# Patient Record
Sex: MALE | Race: WHITE | NOT HISPANIC OR LATINO | Employment: OTHER | URBAN - METROPOLITAN AREA
[De-identification: names, ages, dates, MRNs, and addresses within clinical notes are randomized per-mention and may not be internally consistent; named-entity substitution may affect disease eponyms.]

---

## 2021-07-06 ENCOUNTER — AMBULATORY - HEALTHEAST (OUTPATIENT)
Dept: GERIATRICS | Facility: CLINIC | Age: 76
End: 2021-07-06

## 2021-07-07 RX ORDER — ATORVASTATIN CALCIUM 40 MG/1
40 TABLET, FILM COATED ORAL AT BEDTIME
Status: SHIPPED | COMMUNITY
Start: 2021-07-06

## 2021-07-07 RX ORDER — LOSARTAN POTASSIUM 25 MG/1
25 TABLET ORAL AT BEDTIME
Status: SHIPPED | COMMUNITY
Start: 2021-07-06

## 2021-07-07 RX ORDER — NITROGLYCERIN 0.4 MG/1
0.4 TABLET SUBLINGUAL EVERY 5 MIN PRN
Status: SHIPPED | COMMUNITY
Start: 2021-07-06

## 2021-07-07 RX ORDER — TRAMADOL HYDROCHLORIDE 50 MG/1
25 TABLET ORAL EVERY 6 HOURS PRN
Status: SHIPPED | COMMUNITY
Start: 2021-07-06

## 2021-07-07 RX ORDER — ASPIRIN 81 MG/1
81 TABLET, CHEWABLE ORAL AT BEDTIME
Status: SHIPPED | COMMUNITY
Start: 2021-07-06

## 2021-07-07 RX ORDER — CARVEDILOL 12.5 MG/1
12.5 TABLET ORAL 2 TIMES DAILY WITH MEALS
Status: SHIPPED | COMMUNITY
Start: 2021-07-06

## 2021-07-07 RX ORDER — ACETAMINOPHEN 500 MG
1000 TABLET ORAL 3 TIMES DAILY
Status: SHIPPED | COMMUNITY
Start: 2021-07-06

## 2021-07-07 RX ORDER — FUROSEMIDE 40 MG
60 TABLET ORAL DAILY
Status: SHIPPED | COMMUNITY
Start: 2021-07-07

## 2021-07-11 PROBLEM — I47.29 NSVT (NONSUSTAINED VENTRICULAR TACHYCARDIA) (H): Status: ACTIVE | Noted: 2021-07-07

## 2021-07-11 PROBLEM — I63.9 CEREBROVASCULAR ACCIDENT (CVA) (H): Status: ACTIVE | Noted: 2021-04-19

## 2021-07-11 PROBLEM — I21.4 NSTEMI (NON-ST ELEVATED MYOCARDIAL INFARCTION) (H): Status: ACTIVE | Noted: 2021-07-07

## 2021-07-11 PROBLEM — Z71.89 ACP (ADVANCE CARE PLANNING): Status: ACTIVE | Noted: 2021-03-29
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
## 2021-07-11 PROBLEM — I50.42 CHRONIC COMBINED SYSTOLIC AND DIASTOLIC HEART FAILURE (H): Status: ACTIVE | Noted: 2021-04-01

## 2021-07-11 PROBLEM — C61 PROSTATE CANCER (H): Status: ACTIVE | Noted: 2018-08-22

## 2021-07-11 PROBLEM — I47.10 PAROXYSMAL SUPRAVENTRICULAR TACHYCARDIA (H): Status: ACTIVE | Noted: 2021-06-07

## 2021-07-11 PROBLEM — S42.215A CLOSED NONDISPLACED FRACTURE OF SURGICAL NECK OF LEFT HUMERUS: Status: ACTIVE | Noted: 2021-07-02

## 2021-07-11 PROBLEM — Z78.9 MEDICALLY COMPLEX PATIENT: Status: ACTIVE | Noted: 2021-06-18

## 2021-07-14 ENCOUNTER — LAB REQUISITION (OUTPATIENT)
Dept: LAB | Facility: CLINIC | Age: 76
End: 2021-07-14

## 2021-07-14 VITALS
HEIGHT: 71 IN | HEART RATE: 67 BPM | RESPIRATION RATE: 20 BRPM | SYSTOLIC BLOOD PRESSURE: 119 MMHG | BODY MASS INDEX: 26.57 KG/M2 | TEMPERATURE: 97.7 F | DIASTOLIC BLOOD PRESSURE: 72 MMHG | OXYGEN SATURATION: 95 % | WEIGHT: 189.8 LBS

## 2021-07-14 DIAGNOSIS — S42.215D: ICD-10-CM

## 2021-07-14 DIAGNOSIS — I10 ESSENTIAL (PRIMARY) HYPERTENSION: ICD-10-CM

## 2021-07-14 DIAGNOSIS — E78.00 PURE HYPERCHOLESTEROLEMIA, UNSPECIFIED: ICD-10-CM

## 2021-07-14 DIAGNOSIS — E87.6 HYPOKALEMIA: ICD-10-CM

## 2021-07-14 ASSESSMENT — MIFFLIN-ST. JEOR: SCORE: 1603.06

## 2021-07-15 ENCOUNTER — TRANSITIONAL CARE UNIT VISIT (OUTPATIENT)
Dept: GERIATRICS | Facility: CLINIC | Age: 76
End: 2021-07-15
Payer: MEDICARE

## 2021-07-15 ENCOUNTER — TELEPHONE (OUTPATIENT)
Dept: GERIATRICS | Facility: CLINIC | Age: 76
End: 2021-07-15

## 2021-07-15 DIAGNOSIS — E78.00 PURE HYPERCHOLESTEROLEMIA: ICD-10-CM

## 2021-07-15 DIAGNOSIS — I50.42 CHRONIC COMBINED SYSTOLIC AND DIASTOLIC HEART FAILURE (H): ICD-10-CM

## 2021-07-15 DIAGNOSIS — S42.215D CLOSED NONDISPLACED FRACTURE OF SURGICAL NECK OF LEFT HUMERUS WITH ROUTINE HEALING, UNSPECIFIED FRACTURE MORPHOLOGY, SUBSEQUENT ENCOUNTER: Primary | ICD-10-CM
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
## 2021-07-15 LAB
ANION GAP SERPL CALCULATED.3IONS-SCNC: 13 MMOL/L (ref 5–18)
BUN SERPL-MCNC: 25 MG/DL (ref 8–28)
CALCIUM SERPL-MCNC: 8.8 MG/DL (ref 8.5–10.5)
CHLORIDE BLD-SCNC: 105 MMOL/L (ref 98–107)
CO2 SERPL-SCNC: 25 MMOL/L (ref 22–31)
CREAT SERPL-MCNC: 0.76 MG/DL (ref 0.7–1.3)
ERYTHROCYTE [DISTWIDTH] IN BLOOD BY AUTOMATED COUNT: 14.1 % (ref 10–15)
GFR SERPL CREATININE-BSD FRML MDRD: 87 ML/MIN/1.73M2
GLUCOSE BLD-MCNC: 85 MG/DL (ref 70–125)
HCT VFR BLD AUTO: 36 % (ref 40–53)
HGB BLD-MCNC: 11.9 G/DL (ref 13.3–17.7)
MAGNESIUM SERPL-MCNC: 2 MG/DL (ref 1.8–2.6)
MCH RBC QN AUTO: 30.1 PG (ref 26.5–33)
MCHC RBC AUTO-ENTMCNC: 33.1 G/DL (ref 31.5–36.5)
MCV RBC AUTO: 91 FL (ref 78–100)
PLATELET # BLD AUTO: 178 10E3/UL (ref 150–450)
POTASSIUM BLD-SCNC: 3.6 MMOL/L (ref 3.5–5)
RBC # BLD AUTO: 3.95 10E6/UL (ref 4.4–5.9)
SODIUM SERPL-SCNC: 143 MMOL/L (ref 136–145)
WBC # BLD AUTO: 8.2 10E3/UL (ref 4–11)

## 2021-07-15 PROCEDURE — P9604 ONE-WAY ALLOW PRORATED TRIP: HCPCS

## 2021-07-15 PROCEDURE — 99309 SBSQ NF CARE MODERATE MDM 30: CPT | Performed by: FAMILY MEDICINE

## 2021-07-15 PROCEDURE — 83735 ASSAY OF MAGNESIUM: CPT

## 2021-07-15 PROCEDURE — 85027 COMPLETE CBC AUTOMATED: CPT

## 2021-07-15 PROCEDURE — 36415 COLL VENOUS BLD VENIPUNCTURE: CPT

## 2021-07-15 PROCEDURE — 80048 BASIC METABOLIC PNL TOTAL CA: CPT

## 2021-07-15 NOTE — LETTER
7/15/2021        RE: Quique Hernandez  [address] [facility] [address]        Progress Notes by Tiara Norton MBBS at 7/8/2021 11:59 PM     Author: Tiara Norton MBBS Service: -- Author Type: Physician    Filed: 7/8/2021 12:34 PM Encounter Date: 7/8/2021 Status: Signed    : Tiara Norton MBBS (Physician)         [facility] note      Patient: Quique Hernandez  MRN: [id number]      [facility] [[id number]]  Reason for Visit     Chief Complaint   Patient presents with     Follow-up WEAKNESS AND FRACTURE       Code Status     FULL CODE    Assessment     Closed nondisplaced fracture of the surgical neck of the left humerus.  History of acute mechanical fall.  History of chronic combined systolic and diastolic heart failure.  Hyperlipidemia   generalized weakness    Plan     Patient admitted to the TCU for strengthening and rehab.  He presented after he fell down the stairs.  Fall was felt to be mechanical.  He had left proximal humerus fracture   Currently he is being treated conservatively and has a sling in place and he remains nonweightbearing.  Pain concerns reviewed and he is reporting less pain.  Continue with his Tylenol and tramadol.  Clinically appears to be euvolemic with no evidence of any volume overload either.  Cognitively remains impaired slums is 11/30.  Balance score of 29/56 on his Quintero indicates a moderate high fall risk.  Continue with his PT and OT      History     Patient is a very pleasant 76 y.o. male who is admitted to TCU  Patient presented to the hospital after he fell down stairs.  Fall was felt to be mechanical.  He was noted to have left proximal humerus fracture.  This will be managed conservatively and nonoperatively.  He has been given a shoulder immobilizer.  Pain management optimized using scheduled Tylenol.  He is also on tramadol as needed.  He reports that his pain is controlled  It was felt that he has 20 steps to climb to his apartment and could  benefit from physical therapy.  He also has a history of CHF.  Echocardiogram reveals an ejection fraction of 25 to 30% he will require monitoring in the TCU  On medical management and diuretics  Appears stable on exam no worsening edema noted no shortness of breath reported overall at baseline    Past Medical History     Active Ambulatory (Non-Hospital) Problems    Diagnosis     NSTEMI (non-ST elevated myocardial infarction) (H)     NSVT (nonsustained ventricular tachycardia) (H)     Closed nondisplaced fracture of surgical neck of left humerus     Fall down stairs     Medically complex patient     Paroxysmal supraventricular tachycardia (H)     Cerebrovascular accident (CVA) (H)     Chronic combined systolic and diastolic heart failure (H)     ACP (advance care planning)     Prostate cancer (H)     Nevus of choroid     Osteoarthritis of hip     Pure hypercholesterolemia     Coronary atherosclerosis     Essential hypertension     Gout     Past Medical History:   Diagnosis Date     ACP (advance care planning) 03/29/2021     Cerebrovascular accident (CVA) (H) 04/19/2021     Chronic combined systolic and diastolic heart failure (H) 04/01/2021     Closed nondisplaced fracture of surgical neck of left humerus 07/02/2021     Coronary atherosclerosis 06/26/2006     Essential hypertension 06/26/2006     Fall down stairs 07/02/2021     Gout 06/26/2006     Medically complex patient 06/18/2021     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 His smoking use included cigarettes. He has a 20.00 pack-year smoking history. He quit smokeless tobacco use about 25 years ago.  His smokeless tobacco use included chew. He  reports current alcohol use. He reports that he does not use drugs.    Family History     Reviewed, and family history includes Heart disease in his mother.    Medication List   Post Discharge Medication Reconc  Current Outpatient Medications on File Prior to Visit   Medication Sig Dispense Refill     acetaminophen (TYLENOL) 500 MG tablet Take 1,000 mg by mouth 3 (three) times a day. Max dose 4000mg/24 hrs       aspirin 81 mg chewable tablet Chew 81 mg at bedtime.       atorvastatin (LIPITOR) 40 MG tablet Take 40 mg by mouth at bedtime.       carvediloL (COREG) 12.5 MG tablet Take 12.5 mg by mouth 2 (two) times a day with meals.       furosemide (LASIX) 40 MG tablet Take 60 mg by mouth daily.       losartan (COZAAR) 25 MG tablet Take 25 mg by mouth at bedtime.       nitroglycerin (NITROSTAT) 0.4 MG SL tablet Place 0.4 mg under the tongue every 5 (five) minutes as needed for chest pain. Give 0.4 mg sublingually every 5 minutes as needed for Chest pain; If >24 doses in 30 days provider to authorize       traMADoL (ULTRAM) 50 mg tablet Take 25 mg by mouth every 6 (six) hours as needed for pain.       Current Facility-Administered Medications on File Prior to Visit   Medication Dose Route Frequency Provider Last Rate Last Admin     [DISCONTINUED] GENERIC EXTERNAL MEDICATION     GENERIC EXTERNAL DATA PROVIDER           Allergies     No Known Allergies    Review of Systems   A comprehensive review of 14 systems was done. Pertinent findings noted here and in history of present illness. All the rest negative.  Constitutional: Negative.  Negative for fever, chills, he has activity change, appetite change and fatigue.   HENT: Negative for congestion and facial swelling.    Eyes: Negative for photophobia, redness and visual disturbance.   Respiratory: Negative for cough and chest tightness.    Cardiovascular: Negative for chest pain, palpitations and leg swelling.   Gastrointestinal: Negative for nausea, diarrhea,  "constipation, blood in stool and abdominal distention.   Genitourinary: Negative.    Musculoskeletal: has pain in left shoulder    Skin: Negative.    Neurological: Negative for dizziness, tremors, syncope, weakness, light-headedness and headaches.   Has baseline slurred speech  Hematological: Does not bruise/bleed easily.   Psychiatric/Behavioral: impaired recall       Physical Exam     Recent Vitals 7/7/2021   Height 5' 11\"   Weight 192 lbs 3 oz   BSA (m2) 2.09 m2   /70   Pulse 55   Temp 97.7   SpO2 95       Constitutional: Oriented to person, place, and appears well-developed.   HEENT:  Normocephalic and atraumatic.  Eyes: Conjunctivae and EOM are normal. Pupils are equal, round, and reactive to light. No discharge.  No scleral icterus. Nose normal. Mouth/Throat: Oropharynx is clear and moist. No oropharyngeal exudate.    NECK: Normal range of motion. Neck supple. No JVD present. No tracheal deviation present. No thyromegaly present.   CARDIOVASCULAR: Normal rate, regular rhythm and intact distal pulses.  Exam reveals no gallop and no friction rub.  Systolic murmur present.  PULMONARY: Effort normal and breath sounds normal. No respiratory distress.No Wheezing or rales.  ABDOMEN: Soft. Bowel sounds are normal. No distension and no mass.  There is no tenderness. There is no rebound and no guarding. No HSM.  MUSCULOSKELETAL: Normal range of motion. No edema and no tenderness. Mild kyphosis, no tenderness.  LUE in a sling  LYMPH NODES: Has no cervical, supraclavicular, axillary and groin adenopathy.   NEUROLOGICAL: Alert and oriented to person, place, and time. No cranial nerve deficit.  Normal muscle tone. Coordination normal.   GENITOURINARY: Deferred exam.  SKIN: Skin is warm and dry. No rash noted. No erythema. No pallor.   Ecchymosis and swelling noted around left arm  EXTREMITIES: No cyanosis, no clubbing, no edema. No Deformity.  PSYCHIATRIC: Normal mood, affect and behavior. Recall is impaired      Lab " Results     Recent Results (from the past 240 hour(s))   COVID-19 VIRUS (CORONAVIRUS) BY PCR - EXTERNAL RESULT    Collection Time: 07/02/21  3:28 PM    Specimen: Other    Specimen type and source: Other, Specimen from nasopharyngeal structure (specimen)   Result Value Ref Range    COVID-19 Virus by PCR (External Result) Negative Negative   COVID-19 VIRUS (CORONAVIRUS) BY PCR - EXTERNAL RESULT    Collection Time: 07/05/21  1:43 PM    Specimen: Other    Specimen type and source: Other, Specimen from nasopharyngeal structure (specimen)   Result Value Ref Range    COVID-19 Virus by PCR (External Result) Negative Negative                  Electronically signed by  TODD Leyva                Sincerely,        TODD Leyva

## 2021-07-15 NOTE — PROGRESS NOTES
Progress Notes by Tiara Norton MBBS at 7/8/2021 11:59 PM     Author: Tiara Norton MBBS Service: -- Author Type: Physician    Filed: 7/8/2021 12:34 PM Encounter Date: 7/8/2021 Status: Signed    : Tiara Norton MBBS (Physician)         [facility] note      Patient: Quique Hernandez  MRN: [id number]      [facility] [[id number]]  Reason for Visit     Chief Complaint   Patient presents with     Follow-up WEAKNESS AND FRACTURE       Code Status     FULL CODE    Assessment     Closed nondisplaced fracture of the surgical neck of the left humerus.  History of acute mechanical fall.  History of chronic combined systolic and diastolic heart failure.  Hyperlipidemia   generalized weakness    Plan     Patient admitted to the TCU for strengthening and rehab.  He presented after he fell down the stairs.  Fall was felt to be mechanical.  He had left proximal humerus fracture   Currently he is being treated conservatively and has a sling in place and he remains nonweightbearing.  Pain concerns reviewed and he is reporting less pain.  Continue with his Tylenol and tramadol.  Clinically appears to be euvolemic with no evidence of any volume overload either.  Cognitively remains impaired slums is 11/30.  Balance score of 29/56 on his Quintero indicates a moderate high fall risk.  Continue with his PT and OT      History     Patient is a very pleasant 76 y.o. male who is admitted to TCU  Patient presented to the hospital after he fell down stairs.  Fall was felt to be mechanical.  He was noted to have left proximal humerus fracture.  This will be managed conservatively and nonoperatively.  He has been given a shoulder immobilizer.  Pain management optimized using scheduled Tylenol.  He is also on tramadol as needed.  He reports that his pain is controlled  It was felt that he has 20 steps to climb to his apartment and could benefit from physical therapy.  He also has a history of CHF.  Echocardiogram  reveals an ejection fraction of 25 to 30% he will require monitoring in the TCU  On medical management and diuretics  Appears stable on exam no worsening edema noted no shortness of breath reported overall at baseline    Past Medical History     Active Ambulatory (Non-Hospital) Problems    Diagnosis     NSTEMI (non-ST elevated myocardial infarction) (H)     NSVT (nonsustained ventricular tachycardia) (H)     Closed nondisplaced fracture of surgical neck of left humerus     Fall down stairs     Medically complex patient     Paroxysmal supraventricular tachycardia (H)     Cerebrovascular accident (CVA) (H)     Chronic combined systolic and diastolic heart failure (H)     ACP (advance care planning)     Prostate cancer (H)     Nevus of choroid     Osteoarthritis of hip     Pure hypercholesterolemia     Coronary atherosclerosis     Essential hypertension     Gout     Past Medical History:   Diagnosis Date     ACP (advance care planning) 03/29/2021     Cerebrovascular accident (CVA) (H) 04/19/2021     Chronic combined systolic and diastolic heart failure (H) 04/01/2021     Closed nondisplaced fracture of surgical neck of left humerus 07/02/2021     Coronary atherosclerosis 06/26/2006     Essential hypertension 06/26/2006     Fall down stairs 07/02/2021     Gout 06/26/2006     Medically complex patient 06/18/2021     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 His smoking use included cigarettes. He has a 20.00 pack-year smoking history. He quit smokeless tobacco use about 25 years ago.  His smokeless tobacco use included chew. He reports current alcohol use. He reports that he does not use drugs.    Family  History     Reviewed, and family history includes Heart disease in his mother.    Medication List   Post Discharge Medication Reconc  Current Outpatient Medications on File Prior to Visit   Medication Sig Dispense Refill     acetaminophen (TYLENOL) 500 MG tablet Take 1,000 mg by mouth 3 (three) times a day. Max dose 4000mg/24 hrs       aspirin 81 mg chewable tablet Chew 81 mg at bedtime.       atorvastatin (LIPITOR) 40 MG tablet Take 40 mg by mouth at bedtime.       carvediloL (COREG) 12.5 MG tablet Take 12.5 mg by mouth 2 (two) times a day with meals.       furosemide (LASIX) 40 MG tablet Take 60 mg by mouth daily.       losartan (COZAAR) 25 MG tablet Take 25 mg by mouth at bedtime.       nitroglycerin (NITROSTAT) 0.4 MG SL tablet Place 0.4 mg under the tongue every 5 (five) minutes as needed for chest pain. Give 0.4 mg sublingually every 5 minutes as needed for Chest pain; If >24 doses in 30 days provider to authorize       traMADoL (ULTRAM) 50 mg tablet Take 25 mg by mouth every 6 (six) hours as needed for pain.       Current Facility-Administered Medications on File Prior to Visit   Medication Dose Route Frequency Provider Last Rate Last Admin     [DISCONTINUED] GENERIC EXTERNAL MEDICATION     GENERIC EXTERNAL DATA PROVIDER           Allergies     No Known Allergies    Review of Systems   A comprehensive review of 14 systems was done. Pertinent findings noted here and in history of present illness. All the rest negative.  Constitutional: Negative.  Negative for fever, chills, he has activity change, appetite change and fatigue.   HENT: Negative for congestion and facial swelling.    Eyes: Negative for photophobia, redness and visual disturbance.   Respiratory: Negative for cough and chest tightness.    Cardiovascular: Negative for chest pain, palpitations and leg swelling.   Gastrointestinal: Negative for nausea, diarrhea, constipation, blood in stool and abdominal distention.   Genitourinary: Negative.   "  Musculoskeletal: has pain in left shoulder    Skin: Negative.    Neurological: Negative for dizziness, tremors, syncope, weakness, light-headedness and headaches.   Has baseline slurred speech  Hematological: Does not bruise/bleed easily.   Psychiatric/Behavioral: impaired recall       Physical Exam     Recent Vitals 7/7/2021   Height 5' 11\"   Weight 192 lbs 3 oz   BSA (m2) 2.09 m2   /70   Pulse 55   Temp 97.7   SpO2 95       Constitutional: Oriented to person, place, and appears well-developed.   HEENT:  Normocephalic and atraumatic.  Eyes: Conjunctivae and EOM are normal. Pupils are equal, round, and reactive to light. No discharge.  No scleral icterus. Nose normal. Mouth/Throat: Oropharynx is clear and moist. No oropharyngeal exudate.    NECK: Normal range of motion. Neck supple. No JVD present. No tracheal deviation present. No thyromegaly present.   CARDIOVASCULAR: Normal rate, regular rhythm and intact distal pulses.  Exam reveals no gallop and no friction rub.  Systolic murmur present.  PULMONARY: Effort normal and breath sounds normal. No respiratory distress.No Wheezing or rales.  ABDOMEN: Soft. Bowel sounds are normal. No distension and no mass.  There is no tenderness. There is no rebound and no guarding. No HSM.  MUSCULOSKELETAL: Normal range of motion. No edema and no tenderness. Mild kyphosis, no tenderness.  LUE in a sling  LYMPH NODES: Has no cervical, supraclavicular, axillary and groin adenopathy.   NEUROLOGICAL: Alert and oriented to person, place, and time. No cranial nerve deficit.  Normal muscle tone. Coordination normal.   GENITOURINARY: Deferred exam.  SKIN: Skin is warm and dry. No rash noted. No erythema. No pallor.   Ecchymosis and swelling noted around left arm  EXTREMITIES: No cyanosis, no clubbing, no edema. No Deformity.  PSYCHIATRIC: Normal mood, affect and behavior. Recall is impaired      Lab Results     Recent Results (from the past 240 hour(s))   COVID-19 VIRUS " (CORONAVIRUS) BY PCR - EXTERNAL RESULT    Collection Time: 07/02/21  3:28 PM    Specimen: Other    Specimen type and source: Other, Specimen from nasopharyngeal structure (specimen)   Result Value Ref Range    COVID-19 Virus by PCR (External Result) Negative Negative   COVID-19 VIRUS (CORONAVIRUS) BY PCR - EXTERNAL RESULT    Collection Time: 07/05/21  1:43 PM    Specimen: Other    Specimen type and source: Other, Specimen from nasopharyngeal structure (specimen)   Result Value Ref Range    COVID-19 Virus by PCR (External Result) Negative Negative                  Electronically signed by  TODD Leyva

## 2021-07-15 NOTE — TELEPHONE ENCOUNTER
[facility] Nurse Triage Telephone Note    Provider: Tiara Norton MD  Facility: [facility]  Facility Type:  TCU    Caller: Tavon  Call Back Number: [phone]    Allergies:  No Known Allergies     Reason for call: Nurse calling to report Heme 2, BMP, and Mg levels.      Verbal Order/Direction given by Provider: No new orders.      Provider giving Order:  Tiara Norton MD    Verbal Order given to: Tavon Bynum RN

## 2021-07-19 NOTE — PROGRESS NOTES
[facility] GERIATRIC SERVICES  Chief Complaint   Patient presents with     ASPEN     [facility] Medical Record Number:  [id number]  Place of Service where encounter took place:  [facility] ([facility]) [[id number]]    HISTORY:      HPI:  Quique Hernandez  is 76 year old (1945) undergoing physical and occupational therapy. He is with a history of chronic heart failure and hypertension who was admitted 7/2/2021 with left proximal humerus fracture. He presented to emergency department with left shoulder following a mechanical fall at home.    He was found to have a left non-displaced proximal humerus fracture. He was placed into a shoulder immobilizer, pain managed with scheduled doses of acetaminophen and oxycodone as needed    Today he was seen to review vital signs, pain management, follow-up left humerus fracture and to establish care.  He denied chest pain shortness of breath constipation diarrhea cough or congestion.  He reports his pain is controlled with current medications.  He did have +1 lower extremity edema.  He will follow up with orthopedics on 7/21/2021.  Vital signs are all stable he is afebrile.  He denies any numbness tingling left upper extremity..  Cap refill less than 3 seconds 3+ radial pulse.    ALLERGIES:Patient has no known allergies.    PAST MEDICAL HISTORY:   Past Medical History:   Diagnosis Date     ACP (advance care planning) 03/29/2021    Formatting of this note might be different from the original. Discussed on admission 3/29/2021  - FULL CODE     Cerebrovascular accident (CVA) (H) 04/19/2021     Chronic combined systolic and diastolic heart failure (H) 04/01/2021    Formatting of this note might be different from the original. 3/30/2021 echocardiogram:  1. Severely decreased left ventricular systolic function in global distribution. Estimated left ventricular ejection fraction is 25-30%.      2. Right ventricle was not well visualized.      3. No significant valvular heart disease.       4. Small anterior pericardial effusion. Exudative material in the pericar     Closed nondisplaced fracture of surgical neck of left humerus 07/02/2021    Formatting of this note might be different from the original. 7/2/2021 xray humerus:  One-part proximal left humerus fracture with a nondisplaced transverse fracture through the surgical neck. No dislocation of the humeral head.     Coronary atherosclerosis 06/26/2006     Essential hypertension 06/26/2006    Formatting of this note might be different from the original. Losartan and carvedilol     Fall down stairs 07/02/2021    Formatting of this note might be different from the original. 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 OS   ST near ora     NSVT (nonsustained ventricular tachycardia) (H) 07/07/2021     Osteoarthritis of hip 07/11/2009     Paroxysmal supraventricular tachycardia (H) 06/07/2021    Formatting of this note might be different from the original. 6/7/2021 asymptomatic, noted on telemetry     Prostate cancer (H) 08/22/2018     Pure hypercholesterolemia 03/05/2007    Formatting of this note might be different from the original. Atorvastatin       PAST SURGICAL HISTORY:   has a past surgical history that includes other surgical history; Total Hip Arthroplasty (Right, 07/06/2009); Transrectal Ultrasonic, Transurethral Resection (TUR) Of Prostate Cyst (10/26/2010); Phacoemulsification clear cornea with standard intraocular lens implant (Right, 04/05/2018); Cataract Extraction Extracapsular W/ Intraocular Lens Implantation (Left, 03/22/2018); and other surgical history (10/28/2018).    FAMILY HISTORY: family history includes Heart Disease in his mother.    SOCIAL HISTORY:  reports that he quit smoking about 25 years ago. His smoking use included cigarettes. He has a 20.00 pack-year smoking history. He quit smokeless tobacco  "use about 25 years ago.  His smokeless tobacco use included chew. He reports current alcohol use. He reports that he does not use drugs.    ROS:  Constitutional: Negative for activity change, appetite change, fatigue and fever.   HENT: Negative for congestion.    Respiratory: Negative for cough, shortness of breath and wheezing.    Cardiovascular: Negative for chest pain and leg swelling.   Gastrointestinal: Negative for abdominal distention, abdominal pain, constipation, diarrhea and nausea.   Genitourinary: Negative for dysuria.   Musculoskeletal: Negative for arthralgia. Negative for back pain.  Nonoperative left humerus fracture nonweightbearing  Skin: Negative for color change and wound.   Neurological: Negative for dizziness.   Psychiatric/Behavioral: Negative for agitation, behavioral problems and confusion.     Physical Exam:  Constitutional:       Appearance: Patient is well-developed.  Pleasant gentleman in no acute distress  HENT: Nonweightbearing left upper extremity     Head: Normocephalic.   Eyes:      Conjunctiva/sclera: Conjunctivae normal.   Neck:      Musculoskeletal: Normal range of motion.   Cardiovascular:      Rate and Rhythm: Normal rate and regular rhythm.      Heart sounds: Normal heart sounds. No murmur.   Pulmonary:      Effort: No respiratory distress.      Breath sounds: Normal breath sounds. No wheezing or rales.   Abdominal:      General: Bowel sounds are normal. There is no distension.      Palpations: Abdomen is soft.      Tenderness: There is no abdominal tenderness.   Musculoskeletal:      Nonweightbearing left upper extremity    Skin:General:        Skin is warm.   Neurological:         Mental Status: Patient is alert and oriented to person, place, and time.   Psychiatric:         Behavior: Behavior normal.     Vitals:/54   Pulse 59   Temp 97.5  F (36.4  C)   Resp 20   Ht 1.803 m (5' 11\")   Wt 86.9 kg (191 lb 9.6 oz)   SpO2 94%   BMI 26.72 kg/m   and Body mass index " is 26.72 kg/m .    Lab/Diagnostic data:   No results found for this or any previous visit (from the past 240 hour(s)).    MEDICATIONS:     Review of your medicines          Accurate as of July 20, 2021 11:59 PM. If you have any questions, ask your nurse or doctor.            CONTINUE these medicines which have NOT CHANGED      Dose / Directions   acetaminophen 500 MG tablet  Commonly known as: TYLENOL      Dose: 1,000 mg  [ACETAMINOPHEN (TYLENOL) 500 MG TABLET] Take 1,000 mg by mouth 3 (three) times a day. Max dose 4000mg/24 hrs  Refills: 0     aspirin 81 MG chewable tablet  Commonly known as: ASA      Dose: 81 mg  [ASPIRIN 81 MG CHEWABLE TABLET] Chew 81 mg at bedtime.  Refills: 0     atorvastatin 40 MG tablet  Commonly known as: LIPITOR      Dose: 40 mg  [ATORVASTATIN (LIPITOR) 40 MG TABLET] Take 40 mg by mouth at bedtime.  Refills: 0     carvedilol 12.5 MG tablet  Commonly known as: COREG      Dose: 12.5 mg  [CARVEDILOL (COREG) 12.5 MG TABLET] Take 12.5 mg by mouth 2 (two) times a day with meals.  Refills: 0     furosemide 40 MG tablet  Commonly known as: LASIX      Dose: 60 mg  [FUROSEMIDE (LASIX) 40 MG TABLET] Take 60 mg by mouth daily.  Refills: 0     Lidocaine 4 % Patch  Commonly known as: LIDOCARE      Dose: 1 patch  Place 1 patch onto the skin every 24 hours To prevent lidocaine toxicity, patient should be patch free for 12 hrs daily.  Refills: 0     losartan 25 MG tablet  Commonly known as: COZAAR      Dose: 25 mg  [LOSARTAN (COZAAR) 25 MG TABLET] Take 25 mg by mouth at bedtime.  Refills: 0     nitroGLYcerin 0.4 MG sublingual tablet  Commonly known as: NITROSTAT      Dose: 0.4 mg  [NITROGLYCERIN (NITROSTAT) 0.4 MG SL TABLET] Place 0.4 mg under the tongue every 5 (five) minutes as needed for chest pain. Give 0.4 mg sublingually every 5 minutes as needed for Chest pain; If >24 doses in 30 days provider to authorize  Refills: 0     traMADol 50 MG tablet  Commonly known as: ULTRAM      Dose: 25 mg  [TRAMADOL  (ULTRAM) 50 MG TABLET] Take 25 mg by mouth every 6 (six) hours as needed for pain.  Refills: 0            ASSESSMENT/PLAN  Nonoperative left humerus fracture follow-up with orthopedics as scheduled pain control    Pain management as needed tramadol, scheduled extra strength Tylenol, lidocaine patch    Congestive heart failure Daily weights continue 60 mg of Lasix daily    Hypertension on carvedilol, losartan    HDL on atorvastatin    Electronically signed by: Kathia Gupta CNP

## 2021-07-20 ENCOUNTER — TRANSITIONAL CARE UNIT VISIT (OUTPATIENT)
Dept: GERIATRICS | Facility: CLINIC | Age: 76
End: 2021-07-20
Payer: MEDICARE

## 2021-07-20 VITALS
WEIGHT: 191.6 LBS | OXYGEN SATURATION: 94 % | BODY MASS INDEX: 26.82 KG/M2 | HEART RATE: 59 BPM | HEIGHT: 71 IN | TEMPERATURE: 97.5 F | RESPIRATION RATE: 20 BRPM | SYSTOLIC BLOOD PRESSURE: 132 MMHG | DIASTOLIC BLOOD PRESSURE: 54 MMHG

## 2021-07-20 DIAGNOSIS — S42.215D CLOSED NONDISPLACED FRACTURE OF SURGICAL NECK OF LEFT HUMERUS WITH ROUTINE HEALING, UNSPECIFIED FRACTURE MORPHOLOGY, SUBSEQUENT ENCOUNTER: Primary | ICD-10-CM

## 2021-07-20 DIAGNOSIS — R53.81 PHYSICAL DECONDITIONING: ICD-10-CM

## 2021-07-20 DIAGNOSIS — I50.42 CHRONIC COMBINED SYSTOLIC AND DIASTOLIC HEART FAILURE (H): ICD-10-CM

## 2021-07-20 PROCEDURE — 99309 SBSQ NF CARE MODERATE MDM 30: CPT | Performed by: NURSE PRACTITIONER

## 2021-07-20 RX ORDER — LIDOCAINE 4 G/G
1 PATCH TOPICAL EVERY 24 HOURS
COMMUNITY

## 2021-07-20 ASSESSMENT — MIFFLIN-ST. JEOR: SCORE: 1611.22

## 2021-07-20 NOTE — LETTER
7/20/2021        RE: Quique Hernandez  [address] [address]        [facility] GERIATRIC SERVICES  Chief Complaint   Patient presents with     RECHECK     [facility] Medical Record Number:  [id number]  Place of Service where encounter took place:  [facility] ([facility]) [[id number]]    HISTORY:      HPI:  Quique Hernandez  is 76 year old (1945) undergoing physical and occupational therapy. He is with a history of chronic heart failure and hypertension who was admitted 7/2/2021 with left proximal humerus fracture. He presented to emergency department with left shoulder following a mechanical fall at home.    He was found to have a left non-displaced proximal humerus fracture. He was placed into a shoulder immobilizer, pain managed with scheduled doses of acetaminophen and oxycodone as needed    Today he was seen to review vital signs, pain management, follow-up left humerus fracture and to establish care.  He denied chest pain shortness of breath constipation diarrhea cough or congestion.  He reports his pain is controlled with current medications.  He did have +1 lower extremity edema.  He will follow up with orthopedics on 7/21/2021.  Vital signs are all stable he is afebrile.  He denies any numbness tingling left upper extremity..  Cap refill less than 3 seconds 3+ radial pulse.    ALLERGIES:Patient has no known allergies.    PAST MEDICAL HISTORY:   Past Medical History:   Diagnosis Date     ACP (advance care planning) 03/29/2021    Formatting of this note might be different from the original. Discussed on admission 3/29/2021  - FULL CODE     Cerebrovascular accident (CVA) (H) 04/19/2021     Chronic combined systolic and diastolic heart failure (H) 04/01/2021    Formatting of this note might be different from the original. 3/30/2021 echocardiogram:  1. Severely decreased left ventricular systolic function in global distribution. Estimated left ventricular ejection fraction is 25-30%.      2. Right  ventricle was not well visualized.      3. No significant valvular heart disease.      4. Small anterior pericardial effusion. Exudative material in the pericar     Closed nondisplaced fracture of surgical neck of left humerus 07/02/2021    Formatting of this note might be different from the original. 7/2/2021 xray humerus:  One-part proximal left humerus fracture with a nondisplaced transverse fracture through the surgical neck. No dislocation of the humeral head.     Coronary atherosclerosis 06/26/2006     Essential hypertension 06/26/2006    Formatting of this note might be different from the original. Losartan and carvedilol     Fall down stairs 07/02/2021    Formatting of this note might be different from the original. 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 OS   ST near ora     NSVT (nonsustained ventricular tachycardia) (H) 07/07/2021     Osteoarthritis of hip 07/11/2009     Paroxysmal supraventricular tachycardia (H) 06/07/2021    Formatting of this note might be different from the original. 6/7/2021 asymptomatic, noted on telemetry     Prostate cancer (H) 08/22/2018     Pure hypercholesterolemia 03/05/2007    Formatting of this note might be different from the original. Atorvastatin       PAST SURGICAL HISTORY:   has a past surgical history that includes other surgical history; Total Hip Arthroplasty (Right, 07/06/2009); Transrectal Ultrasonic, Transurethral Resection (TUR) Of Prostate Cyst (10/26/2010); Phacoemulsification clear cornea with standard intraocular lens implant (Right, 04/05/2018); Cataract Extraction Extracapsular W/ Intraocular Lens Implantation (Left, 03/22/2018); and other surgical history (10/28/2018).    FAMILY HISTORY: family history includes Heart Disease in his mother.    SOCIAL HISTORY:  reports that he quit smoking about 25 years ago. His smoking use  included cigarettes. He has a 20.00 pack-year smoking history. He quit smokeless tobacco use about 25 years ago.  His smokeless tobacco use included chew. He reports current alcohol use. He reports that he does not use drugs.    ROS:  Constitutional: Negative for activity change, appetite change, fatigue and fever.   HENT: Negative for congestion.    Respiratory: Negative for cough, shortness of breath and wheezing.    Cardiovascular: Negative for chest pain and leg swelling.   Gastrointestinal: Negative for abdominal distention, abdominal pain, constipation, diarrhea and nausea.   Genitourinary: Negative for dysuria.   Musculoskeletal: Negative for arthralgia. Negative for back pain.  Nonoperative left humerus fracture nonweightbearing  Skin: Negative for color change and wound.   Neurological: Negative for dizziness.   Psychiatric/Behavioral: Negative for agitation, behavioral problems and confusion.     Physical Exam:  Constitutional:       Appearance: Patient is well-developed.  Pleasant gentleman in no acute distress  HENT: Nonweightbearing left upper extremity     Head: Normocephalic.   Eyes:      Conjunctiva/sclera: Conjunctivae normal.   Neck:      Musculoskeletal: Normal range of motion.   Cardiovascular:      Rate and Rhythm: Normal rate and regular rhythm.      Heart sounds: Normal heart sounds. No murmur.   Pulmonary:      Effort: No respiratory distress.      Breath sounds: Normal breath sounds. No wheezing or rales.   Abdominal:      General: Bowel sounds are normal. There is no distension.      Palpations: Abdomen is soft.      Tenderness: There is no abdominal tenderness.   Musculoskeletal:      Nonweightbearing left upper extremity    Skin:General:        Skin is warm.   Neurological:         Mental Status: Patient is alert and oriented to person, place, and time.   Psychiatric:         Behavior: Behavior normal.     Vitals:/54   Pulse 59   Temp 97.5  F (36.4  C)   Resp 20   Ht 1.803 m (5'  "11\")   Wt 86.9 kg (191 lb 9.6 oz)   SpO2 94%   BMI 26.72 kg/m   and Body mass index is 26.72 kg/m .    Lab/Diagnostic data:   No results found for this or any previous visit (from the past 240 hour(s)).    MEDICATIONS:     Review of your medicines          Accurate as of July 20, 2021 11:59 PM. If you have any questions, ask your nurse or doctor.            CONTINUE these medicines which have NOT CHANGED      Dose / Directions   acetaminophen 500 MG tablet  Commonly known as: TYLENOL      Dose: 1,000 mg  [ACETAMINOPHEN (TYLENOL) 500 MG TABLET] Take 1,000 mg by mouth 3 (three) times a day. Max dose 4000mg/24 hrs  Refills: 0     aspirin 81 MG chewable tablet  Commonly known as: ASA      Dose: 81 mg  [ASPIRIN 81 MG CHEWABLE TABLET] Chew 81 mg at bedtime.  Refills: 0     atorvastatin 40 MG tablet  Commonly known as: LIPITOR      Dose: 40 mg  [ATORVASTATIN (LIPITOR) 40 MG TABLET] Take 40 mg by mouth at bedtime.  Refills: 0     carvedilol 12.5 MG tablet  Commonly known as: COREG      Dose: 12.5 mg  [CARVEDILOL (COREG) 12.5 MG TABLET] Take 12.5 mg by mouth 2 (two) times a day with meals.  Refills: 0     furosemide 40 MG tablet  Commonly known as: LASIX      Dose: 60 mg  [FUROSEMIDE (LASIX) 40 MG TABLET] Take 60 mg by mouth daily.  Refills: 0     Lidocaine 4 % Patch  Commonly known as: LIDOCARE      Dose: 1 patch  Place 1 patch onto the skin every 24 hours To prevent lidocaine toxicity, patient should be patch free for 12 hrs daily.  Refills: 0     losartan 25 MG tablet  Commonly known as: COZAAR      Dose: 25 mg  [LOSARTAN (COZAAR) 25 MG TABLET] Take 25 mg by mouth at bedtime.  Refills: 0     nitroGLYcerin 0.4 MG sublingual tablet  Commonly known as: NITROSTAT      Dose: 0.4 mg  [NITROGLYCERIN (NITROSTAT) 0.4 MG SL TABLET] Place 0.4 mg under the tongue every 5 (five) minutes as needed for chest pain. Give 0.4 mg sublingually every 5 minutes as needed for Chest pain; If >24 doses in 30 days provider to " authorize  Refills: 0     traMADol 50 MG tablet  Commonly known as: ULTRAM      Dose: 25 mg  [TRAMADOL (ULTRAM) 50 MG TABLET] Take 25 mg by mouth every 6 (six) hours as needed for pain.  Refills: 0            ASSESSMENT/PLAN  Nonoperative left humerus fracture follow-up with orthopedics as scheduled pain control    Pain management as needed tramadol, scheduled extra strength Tylenol, lidocaine patch    Congestive heart failure Daily weights continue 60 mg of Lasix daily    Hypertension on carvedilol, losartan    HDL on atorvastatin    Electronically signed by: Kathia Gupta CNP            Sincerely,        Kathia Gupta CNP

## 2021-08-03 ENCOUNTER — DISCHARGE SUMMARY NURSING HOME (OUTPATIENT)
Dept: GERIATRICS | Facility: CLINIC | Age: 76
End: 2021-08-03
Payer: MEDICARE

## 2021-08-03 VITALS
SYSTOLIC BLOOD PRESSURE: 110 MMHG | HEIGHT: 71 IN | WEIGHT: 190.6 LBS | OXYGEN SATURATION: 95 % | TEMPERATURE: 97.3 F | DIASTOLIC BLOOD PRESSURE: 50 MMHG | HEART RATE: 51 BPM | BODY MASS INDEX: 26.68 KG/M2 | RESPIRATION RATE: 18 BRPM

## 2021-08-03 DIAGNOSIS — S42.309A HUMERUS FRACTURE: Primary | ICD-10-CM

## 2021-08-03 DIAGNOSIS — I50.42 CHRONIC COMBINED SYSTOLIC AND DIASTOLIC HEART FAILURE (H): ICD-10-CM

## 2021-08-03 PROCEDURE — 99315 NF DSCHRG MGMT 30 MIN/LESS: CPT | Performed by: NURSE PRACTITIONER

## 2021-08-03 ASSESSMENT — MIFFLIN-ST. JEOR: SCORE: 1606.69

## 2021-08-03 NOTE — PROGRESS NOTES
[facility]    Chief Complaint   Patient presents with     Discharge Summary [facility] Medical Record Number:  [id number]  Place of Service where encounter took place:  [facility] (TCU) [[id number]]  Code Status:  No Order     HISTORY:      HPI:  Quique Hernandez  is 76 year old (1945) undergoing physical and occupational therapy. Quique Hernandez  is 76 year old (1945) undergoing physical and occupational therapy. He is with a history of chronic heart failure and hypertension who was admitted 7/2/2021 with left proximal humerus fracture. He presented to emergency department with left shoulder following a mechanical fall at home.    He was found to have a left non-displaced proximal humerus fracture. He was placed into a shoulder immobilizer, pain managed with scheduled doses of acetaminophen and oxycodone as needed     Today he was seen to review vital signs, pain management, follow-up left humerus fracture and a face-to-face for discharge.  Patient will discharge on 8/5/2021 to assisted living in [place].  This is a new place for him.  He will have [facility] home health aide..  He denied chest pain shortness of breath constipation diarrhea cough or congestion.  He reports his pain is controlled with current medications.  He did have +1 lower extremity edema.     He denies any numbness tingling left upper extremity..  Cap refill less than 3 seconds 3+ radial pulse.  He was noted to have a documented pulse of 51.  He was checked by writer apically and he was 64.  He denies any dizziness his pulses have ranged in the 50s to 60s.    ALLERGIES:Patient has no known allergies.    PAST MEDICAL HISTORY:   Past Medical History:   Diagnosis Date     ACP (advance care planning) 03/29/2021    Formatting of this note might be different from the original. 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 3/30/2021 echocardiogram:  1. Severely decreased left ventricular systolic function in global distribution. Estimated left ventricular ejection fraction is 25-30%.      2. Right ventricle was not well visualized.      3. No significant valvular heart disease.      4. Small anterior pericardial effusion. Exudative material in the pericar     Closed nondisplaced fracture of surgical neck of left humerus 07/02/2021    Formatting of this note might be different from the original. 7/2/2021 xray humerus:  One-part proximal left humerus fracture with a nondisplaced transverse fracture through the surgical neck. No dislocation of the humeral head.     Coronary atherosclerosis 06/26/2006     Essential hypertension 06/26/2006    Formatting of this note might be different from the original. Losartan and carvedilol     Fall down stairs 07/02/2021    Formatting of this note might be different from the original. 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 OS   ST near ora     NSVT (nonsustained ventricular tachycardia) (H) 07/07/2021     Osteoarthritis of hip 07/11/2009     Paroxysmal supraventricular tachycardia (H) 06/07/2021    Formatting of this note might be different from the original. 6/7/2021 asymptomatic, noted on telemetry     Prostate cancer (H) 08/22/2018     Pure hypercholesterolemia 03/05/2007    Formatting of this note might be different from the original. Atorvastatin       PAST SURGICAL HISTORY:   has a past surgical history that includes other surgical history; Total Hip Arthroplasty (Right, 07/06/2009); Transrectal Ultrasonic, Transurethral Resection (TUR) Of Prostate Cyst (10/26/2010); Phacoemulsification clear cornea with standard intraocular lens implant (Right,  04/05/2018); Cataract Extraction Extracapsular W/ Intraocular Lens Implantation (Left, 03/22/2018); and other surgical history (10/28/2018).    FAMILY HISTORY: family history includes Heart Disease in his mother.    SOCIAL HISTORY:  reports that he quit smoking about 25 years ago. His smoking use included cigarettes. He has a 20.00 pack-year smoking history. He quit smokeless tobacco use about 25 years ago.  His smokeless tobacco use included chew. He reports current alcohol use. He reports that he does not use drugs.    ROS:  Constitutional: Negative for activity change, appetite change, fatigue and fever.   HENT: Negative for congestion.    Respiratory: Negative for cough, shortness of breath and wheezing.    Cardiovascular: Negative for chest pain and leg swelling.   Gastrointestinal: Negative for abdominal distention, abdominal pain, constipation, diarrhea and nausea.   Genitourinary: Negative for dysuria.   Musculoskeletal: Negative for arthralgia. Negative for back pain.   Skin: Negative for color change and wound.   Neurological: Negative for dizziness.   Psychiatric/Behavioral: Negative for agitation, behavioral problems and confusion.     Physical Exam:  Constitutional:       Appearance: Patient is well-developed.   HENT:      Head: Normocephalic.   Eyes:      Conjunctiva/sclera: Conjunctivae normal.   Neck:      Musculoskeletal: Normal range of motion.   Cardiovascular:      Rate and Rhythm: Normal rate and regular rhythm.      Heart sounds: Normal heart sounds. No murmur.   Pulmonary:      Effort: No respiratory distress.      Breath sounds: Normal breath sounds. No wheezing or rales.   Abdominal:      General: Bowel sounds are normal. There is no distension.      Palpations: Abdomen is soft.      Tenderness: There is no abdominal tenderness.   Musculoskeletal:       Normal range of motion.  Nonoperative left humerus fracture    Skin:General:        Skin is warm.   Neurological:         Mental Status:  "Patient is alert and oriented to person, place, and time.   Psychiatric:         Behavior: Behavior normal.     Vitals:  /50   Pulse 51   Temp 97.3  F (36.3  C)   Resp 18   Ht 1.803 m (5' 11\")   Wt 86.5 kg (190 lb 9.6 oz)   SpO2 95%   BMI 26.58 kg/m    Body mass index is 26.58 kg/m .      MEDICATIONS:     Review of your medicines          Accurate as of August 3, 2021 10:19 AM. If you have any questions, ask your nurse or doctor.            CONTINUE these medicines which have NOT CHANGED      Dose / Directions   acetaminophen 500 MG tablet  Commonly known as: TYLENOL      Dose: 1,000 mg  [ACETAMINOPHEN (TYLENOL) 500 MG TABLET] Take 1,000 mg by mouth 3 (three) times a day. Max dose 4000mg/24 hrs  Refills: 0     aspirin 81 MG chewable tablet  Commonly known as: ASA      Dose: 81 mg  [ASPIRIN 81 MG CHEWABLE TABLET] Chew 81 mg at bedtime.  Refills: 0     atorvastatin 40 MG tablet  Commonly known as: LIPITOR      Dose: 40 mg  [ATORVASTATIN (LIPITOR) 40 MG TABLET] Take 40 mg by mouth at bedtime.  Refills: 0     carvedilol 12.5 MG tablet  Commonly known as: COREG      Dose: 12.5 mg  [CARVEDILOL (COREG) 12.5 MG TABLET] Take 12.5 mg by mouth 2 (two) times a day with meals.  Refills: 0     furosemide 40 MG tablet  Commonly known as: LASIX      Dose: 60 mg  [FUROSEMIDE (LASIX) 40 MG TABLET] Take 60 mg by mouth daily.  Refills: 0     Lidocaine 4 % Patch  Commonly known as: LIDOCARE      Dose: 1 patch  Place 1 patch onto the skin every 24 hours To prevent lidocaine toxicity, patient should be patch free for 12 hrs daily.  Refills: 0     losartan 25 MG tablet  Commonly known as: COZAAR      Dose: 25 mg  [LOSARTAN (COZAAR) 25 MG TABLET] Take 25 mg by mouth at bedtime.  Refills: 0     nitroGLYcerin 0.4 MG sublingual tablet  Commonly known as: NITROSTAT      Dose: 0.4 mg  [NITROGLYCERIN (NITROSTAT) 0.4 MG SL TABLET] Place 0.4 mg under the tongue every 5 (five) minutes as needed for chest pain. Give 0.4 mg sublingually " every 5 minutes as needed for Chest pain; If >24 doses in 30 days provider to authorize  Refills: 0     traMADol 50 MG tablet  Commonly known as: ULTRAM      Dose: 25 mg  [TRAMADOL (ULTRAM) 50 MG TABLET] Take 25 mg by mouth every 6 (six) hours as needed for pain.  Refills: 0             DISCHARGE DIAGNOSIS:  Encounter Diagnoses   Name Primary?     Humerus fracture Yes     Chronic combined systolic and diastolic heart failure (H)      Nonoperative left humerus fracture follow-up with orthopedics as scheduled pain control     Pain management as needed tramadol, scheduled extra strength Tylenol, lidocaine patch     Congestive heart failure Daily weights continue 60 mg of Lasix daily     Hypertension on carvedilol, losartan     HDL on atorvastatin    MEDICAL EQUIPMENT NEEDS:  none    DISCHARGE PLAN/FACE TO FACE:  I certify that services are/were furnished while this patient was under the care of a physician and that a physician or an allowed non-physician practitioner (NPP), had a face-to-face encounter that meets the physician face-to-face encounter requirements. The encounter was in whole, or in part, related to the primary reason for home health. The patient is confined to his/her home and needs intermittent skilled nursing, physical therapy, speech-language pathology, or the continued need for occupational therapy. A plan of care has been established by a physician and is periodically reviewed by a physician.  Date of Face-to-Face Encounter: 8/3/21    I certify that, based on my findings, the following services are medically necessary home health services: Pt/OT/HHA    My clinical findings support the need for the above skilled services because: PT OT for continued strength and endurance following left humerus fracture nonoperative.  Home health aide to assist with activities of daily living    This patient is homebound because: He is deconditioned following recent hospitalization for nonoperative left humerus  fracture    The patient is, or has been, under my care and I have initiated the establishment of the plan of care. This patient will be followed by a physician who will periodically review the plan of care.    Schedule follow up visit with primary care provider within 7 days to reestablish care.    Electronically signed by: Kathia Gupta CNP

## 2021-08-03 NOTE — LETTER
8/3/2021        RE: Quique Hernandez  [address] [address]        [facility]    Chief Complaint   Patient presents with     Discharge Summary [facility] Medical Record Number:  [id number]  Place of Service where encounter took place:  [facility] (TCU) [[id number]]  Code Status:  No Order     HISTORY:      HPI:  Quique Hernandez  is 76 year old (1945) undergoing physical and occupational therapy. Quique Hernandez  is 76 year old (1945) undergoing physical and occupational therapy. He is with a history of chronic heart failure and hypertension who was admitted 7/2/2021 with left proximal humerus fracture. He presented to emergency department with left shoulder following a mechanical fall at home.    He was found to have a left non-displaced proximal humerus fracture. He was placed into a shoulder immobilizer, pain managed with scheduled doses of acetaminophen and oxycodone as needed     Today he was seen to review vital signs, pain management, follow-up left humerus fracture and a face-to-face for discharge.  Patient will discharge on 8/5/2021 to assisted living in [place].  This is a new place for him.  He will have [facility] OT home health aide..  He denied chest pain shortness of breath constipation diarrhea cough or congestion.  He reports his pain is controlled with current medications.  He did have +1 lower extremity edema.     He denies any numbness tingling left upper extremity..  Cap refill less than 3 seconds 3+ radial pulse.  He was noted to have a documented pulse of 51.  He was checked by writer apically and he was 64.  He denies any dizziness his pulses have ranged in the 50s to 60s.    ALLERGIES:Patient has no known allergies.    PAST MEDICAL HISTORY:   Past Medical History:   Diagnosis Date     ACP (advance care planning) 03/29/2021    Formatting of this note might be different from the original. Discussed on admission 3/29/2021   - FULL CODE     Cerebrovascular accident (CVA) (H) 04/19/2021     Chronic combined systolic and diastolic heart failure (H) 04/01/2021    Formatting of this note might be different from the original. 3/30/2021 echocardiogram:  1. Severely decreased left ventricular systolic function in global distribution. Estimated left ventricular ejection fraction is 25-30%.      2. Right ventricle was not well visualized.      3. No significant valvular heart disease.      4. Small anterior pericardial effusion. Exudative material in the pericar     Closed nondisplaced fracture of surgical neck of left humerus 07/02/2021    Formatting of this note might be different from the original. 7/2/2021 xray humerus:  One-part proximal left humerus fracture with a nondisplaced transverse fracture through the surgical neck. No dislocation of the humeral head.     Coronary atherosclerosis 06/26/2006     Essential hypertension 06/26/2006    Formatting of this note might be different from the original. Losartan and carvedilol     Fall down stairs 07/02/2021    Formatting of this note might be different from the original. 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 OS   ST near ora     NSVT (nonsustained ventricular tachycardia) (H) 07/07/2021     Osteoarthritis of hip 07/11/2009     Paroxysmal supraventricular tachycardia (H) 06/07/2021    Formatting of this note might be different from the original. 6/7/2021 asymptomatic, noted on telemetry     Prostate cancer (H) 08/22/2018     Pure hypercholesterolemia 03/05/2007    Formatting of this note might be different from the original. Atorvastatin       PAST SURGICAL HISTORY:   has a past surgical history that includes other surgical history; Total Hip Arthroplasty (Right, 07/06/2009); Transrectal Ultrasonic, Transurethral Resection (TUR) Of Prostate Cyst (10/26/2010);  Phacoemulsification clear cornea with standard intraocular lens implant (Right, 04/05/2018); Cataract Extraction Extracapsular W/ Intraocular Lens Implantation (Left, 03/22/2018); and other surgical history (10/28/2018).    FAMILY HISTORY: family history includes Heart Disease in his mother.    SOCIAL HISTORY:  reports that he quit smoking about 25 years ago. His smoking use included cigarettes. He has a 20.00 pack-year smoking history. He quit smokeless tobacco use about 25 years ago.  His smokeless tobacco use included chew. He reports current alcohol use. He reports that he does not use drugs.    ROS:  Constitutional: Negative for activity change, appetite change, fatigue and fever.   HENT: Negative for congestion.    Respiratory: Negative for cough, shortness of breath and wheezing.    Cardiovascular: Negative for chest pain and leg swelling.   Gastrointestinal: Negative for abdominal distention, abdominal pain, constipation, diarrhea and nausea.   Genitourinary: Negative for dysuria.   Musculoskeletal: Negative for arthralgia. Negative for back pain.   Skin: Negative for color change and wound.   Neurological: Negative for dizziness.   Psychiatric/Behavioral: Negative for agitation, behavioral problems and confusion.     Physical Exam:  Constitutional:       Appearance: Patient is well-developed.   HENT:      Head: Normocephalic.   Eyes:      Conjunctiva/sclera: Conjunctivae normal.   Neck:      Musculoskeletal: Normal range of motion.   Cardiovascular:      Rate and Rhythm: Normal rate and regular rhythm.      Heart sounds: Normal heart sounds. No murmur.   Pulmonary:      Effort: No respiratory distress.      Breath sounds: Normal breath sounds. No wheezing or rales.   Abdominal:      General: Bowel sounds are normal. There is no distension.      Palpations: Abdomen is soft.      Tenderness: There is no abdominal tenderness.   Musculoskeletal:       Normal range of motion.  Nonoperative left humerus fracture   "  Skin:General:        Skin is warm.   Neurological:         Mental Status: Patient is alert and oriented to person, place, and time.   Psychiatric:         Behavior: Behavior normal.     Vitals:  /50   Pulse 51   Temp 97.3  F (36.3  C)   Resp 18   Ht 1.803 m (5' 11\")   Wt 86.5 kg (190 lb 9.6 oz)   SpO2 95%   BMI 26.58 kg/m    Body mass index is 26.58 kg/m .      MEDICATIONS:     Review of your medicines          Accurate as of August 3, 2021 10:19 AM. If you have any questions, ask your nurse or doctor.            CONTINUE these medicines which have NOT CHANGED      Dose / Directions   acetaminophen 500 MG tablet  Commonly known as: TYLENOL      Dose: 1,000 mg  [ACETAMINOPHEN (TYLENOL) 500 MG TABLET] Take 1,000 mg by mouth 3 (three) times a day. Max dose 4000mg/24 hrs  Refills: 0     aspirin 81 MG chewable tablet  Commonly known as: ASA      Dose: 81 mg  [ASPIRIN 81 MG CHEWABLE TABLET] Chew 81 mg at bedtime.  Refills: 0     atorvastatin 40 MG tablet  Commonly known as: LIPITOR      Dose: 40 mg  [ATORVASTATIN (LIPITOR) 40 MG TABLET] Take 40 mg by mouth at bedtime.  Refills: 0     carvedilol 12.5 MG tablet  Commonly known as: COREG      Dose: 12.5 mg  [CARVEDILOL (COREG) 12.5 MG TABLET] Take 12.5 mg by mouth 2 (two) times a day with meals.  Refills: 0     furosemide 40 MG tablet  Commonly known as: LASIX      Dose: 60 mg  [FUROSEMIDE (LASIX) 40 MG TABLET] Take 60 mg by mouth daily.  Refills: 0     Lidocaine 4 % Patch  Commonly known as: LIDOCARE      Dose: 1 patch  Place 1 patch onto the skin every 24 hours To prevent lidocaine toxicity, patient should be patch free for 12 hrs daily.  Refills: 0     losartan 25 MG tablet  Commonly known as: COZAAR      Dose: 25 mg  [LOSARTAN (COZAAR) 25 MG TABLET] Take 25 mg by mouth at bedtime.  Refills: 0     nitroGLYcerin 0.4 MG sublingual tablet  Commonly known as: NITROSTAT      Dose: 0.4 mg  [NITROGLYCERIN (NITROSTAT) 0.4 MG SL TABLET] Place 0.4 mg under the tongue " every 5 (five) minutes as needed for chest pain. Give 0.4 mg sublingually every 5 minutes as needed for Chest pain; If >24 doses in 30 days provider to authorize  Refills: 0     traMADol 50 MG tablet  Commonly known as: ULTRAM      Dose: 25 mg  [TRAMADOL (ULTRAM) 50 MG TABLET] Take 25 mg by mouth every 6 (six) hours as needed for pain.  Refills: 0             DISCHARGE DIAGNOSIS:  Encounter Diagnoses   Name Primary?     Humerus fracture Yes     Chronic combined systolic and diastolic heart failure (H)      Nonoperative left humerus fracture follow-up with orthopedics as scheduled pain control     Pain management as needed tramadol, scheduled extra strength Tylenol, lidocaine patch     Congestive heart failure Daily weights continue 60 mg of Lasix daily     Hypertension on carvedilol, losartan     HDL on atorvastatin    MEDICAL EQUIPMENT NEEDS:  none    DISCHARGE PLAN/FACE TO FACE:  I certify that services are/were furnished while this patient was under the care of a physician and that a physician or an allowed non-physician practitioner (NPP), had a face-to-face encounter that meets the physician face-to-face encounter requirements. The encounter was in whole, or in part, related to the primary reason for home health. The patient is confined to his/her home and needs intermittent skilled nursing, physical therapy, speech-language pathology, or the continued need for occupational therapy. A plan of care has been established by a physician and is periodically reviewed by a physician.  Date of Face-to-Face Encounter: 8/3/21    I certify that, based on my findings, the following services are medically necessary home health services: Pt/OT/HHA    My clinical findings support the need for the above skilled services because: PT OT for continued strength and endurance following left humerus fracture nonoperative.  Home health aide to assist with activities of daily living    This patient is homebound because: He is  deconditioned following recent hospitalization for nonoperative left humerus fracture    The patient is, or has been, under my care and I have initiated the establishment of the plan of care. This patient will be followed by a physician who will periodically review the plan of care.    Schedule follow up visit with primary care provider within 7 days to reestablish care.    Electronically signed by: Kathia Gupta CNP          Sincerely,        Kathia Gupta CNP

## 2021-08-13 ENCOUNTER — LAB REQUISITION (OUTPATIENT)
Dept: LAB | Facility: CLINIC | Age: 76
End: 2021-08-13
Payer: MEDICARE

## 2021-08-13 DIAGNOSIS — R31.9 HEMATURIA, UNSPECIFIED: ICD-10-CM

## 2021-08-13 LAB
ALBUMIN UR-MCNC: 10 MG/DL
APPEARANCE UR: ABNORMAL
BACTERIA #/AREA URNS HPF: ABNORMAL /HPF
BILIRUB UR QL STRIP: NEGATIVE
COLOR UR AUTO: ABNORMAL
GLUCOSE UR STRIP-MCNC: NEGATIVE MG/DL
HGB UR QL STRIP: ABNORMAL
KETONES UR STRIP-MCNC: NEGATIVE MG/DL
LEUKOCYTE ESTERASE UR QL STRIP: NEGATIVE
MUCOUS THREADS #/AREA URNS LPF: PRESENT /LPF
NITRATE UR QL: NEGATIVE
PH UR STRIP: 6 [PH] (ref 5–7)
RBC URINE: >182 /HPF
SP GR UR STRIP: 1.01 (ref 1–1.03)
SQUAMOUS EPITHELIAL: 1 /HPF
UROBILINOGEN UR STRIP-MCNC: <2 MG/DL
WBC URINE: 13 /HPF

## 2021-08-13 PROCEDURE — 87086 URINE CULTURE/COLONY COUNT: CPT | Mod: ORL | Performed by: STUDENT IN AN ORGANIZED HEALTH CARE EDUCATION/TRAINING PROGRAM

## 2021-08-13 PROCEDURE — 81001 URINALYSIS AUTO W/SCOPE: CPT | Mod: ORL | Performed by: STUDENT IN AN ORGANIZED HEALTH CARE EDUCATION/TRAINING PROGRAM

## 2021-08-15 LAB — BACTERIA UR CULT: NO GROWTH

## 2021-09-16 ENCOUNTER — TRANSFERRED RECORDS (OUTPATIENT)
Dept: HEALTH INFORMATION MANAGEMENT | Facility: CLINIC | Age: 76
End: 2021-09-16